# Patient Record
Sex: MALE | Race: WHITE | NOT HISPANIC OR LATINO
[De-identification: names, ages, dates, MRNs, and addresses within clinical notes are randomized per-mention and may not be internally consistent; named-entity substitution may affect disease eponyms.]

---

## 2021-09-23 PROBLEM — Z00.00 ENCOUNTER FOR PREVENTIVE HEALTH EXAMINATION: Status: ACTIVE | Noted: 2021-09-23

## 2021-09-28 ENCOUNTER — APPOINTMENT (OUTPATIENT)
Dept: SURGERY | Facility: CLINIC | Age: 81
End: 2021-09-28
Payer: MEDICARE

## 2021-09-28 VITALS — HEIGHT: 72 IN | WEIGHT: 205 LBS | BODY MASS INDEX: 27.77 KG/M2

## 2021-09-28 DIAGNOSIS — Z83.3 FAMILY HISTORY OF DIABETES MELLITUS: ICD-10-CM

## 2021-09-28 DIAGNOSIS — Z82.49 FAMILY HISTORY OF ISCHEMIC HEART DISEASE AND OTHER DISEASES OF THE CIRCULATORY SYSTEM: ICD-10-CM

## 2021-09-28 DIAGNOSIS — M19.90 UNSPECIFIED OSTEOARTHRITIS, UNSPECIFIED SITE: ICD-10-CM

## 2021-09-28 DIAGNOSIS — H91.90 UNSPECIFIED HEARING LOSS, UNSPECIFIED EAR: ICD-10-CM

## 2021-09-28 DIAGNOSIS — D17.79 BENIGN LIPOMATOUS NEOPLASM OF OTHER SITES: ICD-10-CM

## 2021-09-28 DIAGNOSIS — I10 ESSENTIAL (PRIMARY) HYPERTENSION: ICD-10-CM

## 2021-09-28 DIAGNOSIS — Z83.42 FAMILY HISTORY OF FAMILIAL HYPERCHOLESTEROLEMIA: ICD-10-CM

## 2021-09-28 DIAGNOSIS — Z80.0 FAMILY HISTORY OF MALIGNANT NEOPLASM OF DIGESTIVE ORGANS: ICD-10-CM

## 2021-09-28 DIAGNOSIS — E78.5 HYPERLIPIDEMIA, UNSPECIFIED: ICD-10-CM

## 2021-09-28 DIAGNOSIS — Z87.19 PERSONAL HISTORY OF OTHER DISEASES OF THE DIGESTIVE SYSTEM: ICD-10-CM

## 2021-09-28 DIAGNOSIS — L98.9 DISORDER OF THE SKIN AND SUBCUTANEOUS TISSUE, UNSPECIFIED: ICD-10-CM

## 2021-09-28 DIAGNOSIS — Z78.9 OTHER SPECIFIED HEALTH STATUS: ICD-10-CM

## 2021-09-28 PROCEDURE — 99203 OFFICE O/P NEW LOW 30 MIN: CPT

## 2021-09-28 RX ORDER — ROSUVASTATIN CALCIUM 5 MG/1
5 TABLET, FILM COATED ORAL
Refills: 0 | Status: ACTIVE | COMMUNITY

## 2021-09-28 NOTE — ASSESSMENT
[FreeTextEntry1] : Family physician Dr. Sarah Patel\par Date: 9/28/2021\par Our referral facial lesion, soft tissue mass left and right leg\par \par This is a 80-year-old gentleman who noticed a growth on the upper aspect of his forehead in the midline.  He states this happened in July.  He is concerned that this is melanoma and he wished to have this removed.  His wife also noticed a soft tissue mass on the inner aspect of his left thigh as well as on the posterior aspect of his right thigh.  He states he has had these masses for over 5 years and over this time course the wife feels that they have gotten a little larger.  He has no pain overlying these masses.  The masses are always soft.\par \par Physical examination HEENT in the midline at this hairline there is a slight growth and raised tissue that is approximately 3 to 4 mm in diameter.  Given overlying this is slightly  in discolored and tan.  The lesion does not have irregular borders.\par Left leg there is a soft tissue growth that is soft the skin overlying this has the appearance of a protruding nipple.  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  This was conveyed to the patient however the patient is extremely anxious about this lesion and wants this removed.  Patient will be scheduled for an excision in the operating room under local anesthesia and IV sedation.  Locations alternatives and complications of this part of the procedure have been discussed questions answered we will plan obtain written consent the day of surgery.\par With regard to the soft tissue masses on the left anterior leg and right posterior leg these are consistent with lipomas.  Patient his wife and myself had a long conversation regarding the possibilities of lipoma versus liposarcoma and sarcomas are very unlikely however it cannot be entirely ruled out.  This was conveyed to the patient and his wife at this time they understand this and do not wish to undergo excision and accepting of the risks of possible of a liposarcoma even though the risk is extremely low.  Patient recommended to undergo dermatologic evaluation on a yearly basis as well.

## 2021-10-25 ENCOUNTER — APPOINTMENT (OUTPATIENT)
Dept: SURGERY | Facility: HOSPITAL | Age: 81
End: 2021-10-25